# Patient Record
Sex: MALE | Race: WHITE | NOT HISPANIC OR LATINO | Employment: FULL TIME | URBAN - METROPOLITAN AREA
[De-identification: names, ages, dates, MRNs, and addresses within clinical notes are randomized per-mention and may not be internally consistent; named-entity substitution may affect disease eponyms.]

---

## 2019-04-03 ENCOUNTER — OFFICE VISIT (OUTPATIENT)
Dept: URGENT CARE | Facility: CLINIC | Age: 59
End: 2019-04-03
Payer: COMMERCIAL

## 2019-04-03 VITALS
OXYGEN SATURATION: 97 % | RESPIRATION RATE: 15 BRPM | WEIGHT: 197 LBS | HEART RATE: 69 BPM | BODY MASS INDEX: 29.86 KG/M2 | TEMPERATURE: 97 F | DIASTOLIC BLOOD PRESSURE: 80 MMHG | HEIGHT: 68 IN | SYSTOLIC BLOOD PRESSURE: 127 MMHG

## 2019-04-03 DIAGNOSIS — H53.8 BLURRED VISION: Primary | ICD-10-CM

## 2019-04-03 DIAGNOSIS — R73.9 HIGH BLOOD SUGAR: ICD-10-CM

## 2019-04-03 LAB
GLUCOSE SERPL-MCNC: 291 MG/DL (ref 70–110)
POC ANION GAP: 19 MMOL/L (ref 10–20)
POC BUN: 20 MMOL/L (ref 8–26)
POC CHLORIDE: 96 MMOL/L (ref 98–109)
POC CREATININE: 1.2 MG/DL (ref 0.6–1.3)
POC HEMATOCRIT: 39 %PCV (ref 42–52)
POC HEMOGLOBIN: 13.3 G/DL (ref 13.5–18)
POC ICA: 1.2 MMOL/L (ref 1.12–1.32)
POC POTASSIUM: 4.1 MMOL/L (ref 3.5–4.9)
POC SODIUM: 136 MMOL/L (ref 138–146)
POC TCO2: 26 MMOL/L (ref 24–29)

## 2019-04-03 PROCEDURE — 99214 OFFICE O/P EST MOD 30 MIN: CPT | Mod: S$GLB,,, | Performed by: NURSE PRACTITIONER

## 2019-04-03 PROCEDURE — 3008F BODY MASS INDEX DOCD: CPT | Mod: CPTII,S$GLB,, | Performed by: NURSE PRACTITIONER

## 2019-04-03 PROCEDURE — 80047 BASIC METABLC PNL IONIZED CA: CPT | Mod: QW,S$GLB,, | Performed by: NURSE PRACTITIONER

## 2019-04-03 PROCEDURE — 99214 PR OFFICE/OUTPT VISIT, EST, LEVL IV, 30-39 MIN: ICD-10-PCS | Mod: S$GLB,,, | Performed by: NURSE PRACTITIONER

## 2019-04-03 PROCEDURE — 80047 POCT CHEMISTRY PANEL: ICD-10-PCS | Mod: QW,S$GLB,, | Performed by: NURSE PRACTITIONER

## 2019-04-03 PROCEDURE — 3008F PR BODY MASS INDEX (BMI) DOCUMENTED: ICD-10-PCS | Mod: CPTII,S$GLB,, | Performed by: NURSE PRACTITIONER

## 2019-04-03 RX ORDER — EZETIMIBE 10 MG/1
10 TABLET ORAL DAILY
Refills: 5 | COMMUNITY
Start: 2019-03-29

## 2019-04-03 RX ORDER — MELOXICAM 15 MG/1
15 TABLET ORAL DAILY
Refills: 5 | COMMUNITY
Start: 2019-03-28

## 2019-04-03 RX ORDER — METFORMIN HYDROCHLORIDE 500 MG/1
500 TABLET ORAL 2 TIMES DAILY
Refills: 1 | COMMUNITY
Start: 2019-03-20

## 2019-04-03 RX ORDER — VALSARTAN AND HYDROCHLOROTHIAZIDE 160; 12.5 MG/1; MG/1
1 TABLET, FILM COATED ORAL DAILY
Refills: 0 | COMMUNITY
Start: 2019-01-15

## 2019-04-03 RX ORDER — PRAVASTATIN SODIUM 40 MG/1
40 TABLET ORAL DAILY
Refills: 5 | COMMUNITY
Start: 2019-03-29

## 2019-04-03 RX ORDER — OMEPRAZOLE 20 MG/1
20 CAPSULE, DELAYED RELEASE ORAL DAILY
Refills: 4 | COMMUNITY
Start: 2019-03-12

## 2019-04-03 RX ORDER — AMOXICILLIN 875 MG/1
TABLET, FILM COATED ORAL
Refills: 0 | COMMUNITY
Start: 2019-03-29

## 2019-04-03 RX ORDER — LEVOTHYROXINE SODIUM 137 UG/1
137 TABLET ORAL DAILY
Refills: 4 | COMMUNITY
Start: 2019-02-21

## 2019-04-03 NOTE — PROGRESS NOTES
"Subjective:       Patient ID: Med Gleason is a 58 y.o. male.    Vitals:  height is 5' 8" (1.727 m) and weight is 89.4 kg (197 lb). His oral temperature is 97.3 °F (36.3 °C). His blood pressure is 127/80 and his pulse is 69. His respiration is 15 and oxygen saturation is 97%.     Chief Complaint: Blurred Vision    Patient was recently diagnosed with Type 2 Diabetes. Reports blurred vision since yesterday.  Patient was started on metformin approximately 2 weeks ago.  He is visiting from out of town and returns home Friday evening.  Patient has not got a working blood glucose meter at home.  Claims he has had issues with checking his blood sugar at home.  Patient contact his primary care doctor which advised he come to an urgent care to get his blood sugar checked.  Claims that the blurred vision is intermittent.  Denies any nausea, vomiting, or abdominal pain.    Eye Problem    Both eyes are affected.This is a new problem. The current episode started yesterday. The problem occurs constantly. The problem has been unchanged. There was no injury mechanism. The patient is experiencing no pain. There is no known exposure to pink eye. He does not wear contacts. Associated symptoms include blurred vision. Pertinent negatives include no double vision, fever, nausea or vomiting. He has tried nothing for the symptoms.       Constitution: Positive for fatigue. Negative for chills and fever.   HENT: Negative for congestion and sore throat.    Neck: Negative for painful lymph nodes.   Cardiovascular: Negative for chest pain and leg swelling.   Eyes: Positive for blurred vision. Negative for double vision.   Respiratory: Negative for cough and shortness of breath.    Gastrointestinal: Negative for nausea, vomiting and diarrhea.   Genitourinary: Negative for dysuria, frequency and urgency.   Musculoskeletal: Negative for joint pain, joint swelling, muscle cramps and muscle ache.   Skin: Negative for color change, pale " and rash.   Allergic/Immunologic: Negative for seasonal allergies.   Neurological: Negative for dizziness, history of vertigo, light-headedness, passing out and headaches.   Hematologic/Lymphatic: Negative for swollen lymph nodes, easy bruising/bleeding and history of blood clots. Does not bruise/bleed easily.   Psychiatric/Behavioral: Negative for nervous/anxious, sleep disturbance and depression. The patient is not nervous/anxious.        Objective:      Physical Exam   Constitutional: He is oriented to person, place, and time. He appears well-developed and well-nourished.   HENT:   Head: Normocephalic and atraumatic.   Right Ear: External ear normal.   Left Ear: External ear normal.   Nose: Nose normal.   Mouth/Throat: Oropharynx is clear and moist.   Eyes: Pupils are equal, round, and reactive to light. Conjunctivae, EOM and lids are normal.   Neck: Trachea normal, full passive range of motion without pain and phonation normal. Neck supple.   Musculoskeletal: Normal range of motion.   Neurological: He is alert and oriented to person, place, and time.   Skin: Skin is warm, dry and intact.   Psychiatric: He has a normal mood and affect. His speech is normal and behavior is normal. Judgment and thought content normal. Cognition and memory are normal.   Nursing note and vitals reviewed.        Results for orders placed or performed in visit on 04/03/19   POCT Chemistry Panel   Result Value Ref Range    POC Sodium 136 (A) 138 - 146 MMOL/L    POC Potassium 4.1 3.5 - 4.9 MMOL/L    POC Chloride 96 (A) 98 - 109 MMOL/L    POC BUN 20 8 - 26 MMOL/L    POC Glucose 291 (A) 70 - 110 MG/DL    POC Creatinine 1.2 0.6 - 1.3 mg/dL    POC iCA 1.20 1.12 - 1.32 MMOL/L    POC TCO2 26 24 - 29 MMOL/L    POC Hematocrit 39 (A) 42 - 52 %PCV    POC Hemoglobin 13.3 (A) 13.5 - 18 g/dL    POC Anion Gap 19 10.0 - 20 MMOL/L     Patient's blood sugar machine was working in the clinic today.  Checked placements blood sugar with the patient on blood  sugar machine and it was 262.  Assessment:       1. Blurred vision    2. High blood sugar        Plan:       Patient is advised to continue monitoring his blood sugar at home and to contact his primary care doctor.  He is advised that if his symptoms worsen his blood sugar becomes elevated to go to the ER.  He is advised that if his symptoms worsen his blood sugar increases over 300-350 he should go to the ER for further evaluation.  Patient claims that he will contact his primary care doctor regarding his blood sugar reading.  Verbalizes understanding and is in agreement with treatment plan.  Blurred vision  -     Cancel: POCT Glucose, Hand-Held Device    High blood sugar  -     POCT Chemistry Panel      Patient Instructions     Blurred Vision  Blurred vision is the loss of sharpness of vision and the inability to see small details. Any changes in your vision, whether sudden or gradual, should be checked out by an eye specialist.  Vision changes can be caused by many different things. These include eye diseases, side effects of some medicines, or a condition like diabetes. Vision changes should never be ignored. It is common to assume that vision changes are due to needing more powerful vision correction. Making this assumption, many people postpone seeing their healthcare provider about their vision changes. Delaying care is risky, however. Some eye problems, if left untreated, can lead to permanent vision loss that is not correctable with glasses. This can significantly reduce quality of life.  Home care  · Make changes in your home to reduce the risk of falling.  ¨ Keep walkways clear of objects you may trip over. Use nonslip pads under rugs.  ¨ Do not walk in poorly lit areas.  ¨ Be cautious when stepping up and down from curbs and walking on uneven sidewalks.  · Brighter lighting in your home may help you see better.  Follow-up care  Follow up with an eye specialist or as advised. There are two types of eye  doctor you can consult:  · An optometrist is a licensed doctor of optometry. Optometrists are not medical doctors. Optometrists specialize in eye exams and may diagnose some eye problems. They also prescribe glasses and contact lenses.  · An ophthalmologist is a medical doctor who specializes in eye care. Ophthalmologists diagnose and treat all eye diseases, prescribe medicines, and perform eye surgery. They may also prescribe glasses and contact lenses.  An optometrist can provide a basic screening eye exam for much less cost than an ophthalmologist. The optometrist can tell you if your condition needs the services of an ophthalmologist.  When to seek medical advice  Call your healthcare provider right away if any of these occur.  · Sudden change in your vision  · Eye pain, redness, or discharge from your eyelid  · No improvement or worsening of blurriness  · Dark spots in your field of vision  · Halos around lights  · Floaters (dots or strings moving across your field of vision)  · Sudden flash of light inside your eye  · Dimness of vision  · Partial or complete loss of vision  Date Last Reviewed: 6/14/2015 © 2000-2017 [employer]. [address] [place] [address]. All rights reserved. This information is not intended as a substitute for professional medical care. Always follow your healthcare professional's instructions.        Diabetes with High Blood Sugar  You have been treated for high blood sugar (hyperglycemia). This may be because of an infection or other illness, eating too many sweets or starches, or not taking enough insulin.  Home care  Monitor and write down your blood sugar level at least twice a day. Do this before breakfast and before dinner. If you take insulin, record your routine insulin dose as well. Also record any additional doses required based on your sliding scale. Do this for the next 3 to 5 days.  High blood sugar may cause symptoms that you can learn to  "recognize, such as:  · Frequent urination  · Thirst  · Dizziness  · Headache  · Shortness of breath  · Breath that smells fruity  · Nausea or vomiting  · Abdominal pain  · Drowsiness or loss of consciousness  If you have high-blood-sugar symptoms, use a blood or urine test to find out what your blood sugar level is. If it is above your usual range, use the "sliding scale" regular insulin dose prescribed by your healthcare provider. If you were not given a range for your insulin dose, contact your healthcare provider for more advice.  Follow-up care  Follow up with your healthcare provider, or as advised. You may need to meet with your healthcare provider in the next week. You will likely review your blood sugar records together. You may also talk to your provider about adjusting your dose of insulin or other medicine for blood sugar.  When to seek medical advice  Call your healthcare provider right away if these occur:  · High blood sugar symptoms (Symptoms are described above.)  · Blood sugar over 300 mg/dl If you cant reach your healthcare provider, go to a hospital emergency room or urgent care center  Date Last Reviewed: 6/1/2016  © 9645-8374 [employer]. [address] [place] [address]. All rights reserved. This information is not intended as a substitute for professional medical care. Always follow your healthcare professional's instructions.      -Continue to monitor your blood sugar at home.  -If your symptoms worsen and you develop worsening symptoms such as worsening blurred vision, increase thirst, and increase appetite go to the ER.  -Contact your primary care doctor regarding you blood sugar.  -Follow up with your primary care doctor.  Please follow up with your Primary care provider within 2-5 days if your signs and symptoms have not resolved or worsen.     If your condition worsens or fails to improve we recommend that you receive another evaluation at the emergency room " immediately or contact your primary medical clinic to discuss your concerns.   You must understand that you have received an Urgent Care treatment only and that you may be released before all of your medical problems are known or treated. You, the patient, will arrange for follow up care as instructed.

## 2019-04-03 NOTE — PATIENT INSTRUCTIONS
Blurred Vision  Blurred vision is the loss of sharpness of vision and the inability to see small details. Any changes in your vision, whether sudden or gradual, should be checked out by an eye specialist.  Vision changes can be caused by many different things. These include eye diseases, side effects of some medicines, or a condition like diabetes. Vision changes should never be ignored. It is common to assume that vision changes are due to needing more powerful vision correction. Making this assumption, many people postpone seeing their healthcare provider about their vision changes. Delaying care is risky, however. Some eye problems, if left untreated, can lead to permanent vision loss that is not correctable with glasses. This can significantly reduce quality of life.  Home care  · Make changes in your home to reduce the risk of falling.  ¨ Keep walkways clear of objects you may trip over. Use nonslip pads under rugs.  ¨ Do not walk in poorly lit areas.  ¨ Be cautious when stepping up and down from curbs and walking on uneven sidewalks.  · Brighter lighting in your home may help you see better.  Follow-up care  Follow up with an eye specialist or as advised. There are two types of eye doctor you can consult:  · An optometrist is a licensed doctor of optometry. Optometrists are not medical doctors. Optometrists specialize in eye exams and may diagnose some eye problems. They also prescribe glasses and contact lenses.  · An ophthalmologist is a medical doctor who specializes in eye care. Ophthalmologists diagnose and treat all eye diseases, prescribe medicines, and perform eye surgery. They may also prescribe glasses and contact lenses.  An optometrist can provide a basic screening eye exam for much less cost than an ophthalmologist. The optometrist can tell you if your condition needs the services of an ophthalmologist.  When to seek medical advice  Call your healthcare provider right away if any of these  "occur.  · Sudden change in your vision  · Eye pain, redness, or discharge from your eyelid  · No improvement or worsening of blurriness  · Dark spots in your field of vision  · Halos around lights  · Floaters (dots or strings moving across your field of vision)  · Sudden flash of light inside your eye  · Dimness of vision  · Partial or complete loss of vision  Date Last Reviewed: 6/14/2015  © 0293-8945 [employer]. [address] [place] [address], [place] [address]. All rights reserved. This information is not intended as a substitute for professional medical care. Always follow your healthcare professional's instructions.        Diabetes with High Blood Sugar  You have been treated for high blood sugar (hyperglycemia). This may be because of an infection or other illness, eating too many sweets or starches, or not taking enough insulin.  Home care  Monitor and write down your blood sugar level at least twice a day. Do this before breakfast and before dinner. If you take insulin, record your routine insulin dose as well. Also record any additional doses required based on your sliding scale. Do this for the next 3 to 5 days.  High blood sugar may cause symptoms that you can learn to recognize, such as:  · Frequent urination  · Thirst  · Dizziness  · Headache  · Shortness of breath  · Breath that smells fruity  · Nausea or vomiting  · Abdominal pain  · Drowsiness or loss of consciousness  If you have high-blood-sugar symptoms, use a blood or urine test to find out what your blood sugar level is. If it is above your usual range, use the "sliding scale" regular insulin dose prescribed by your healthcare provider. If you were not given a range for your insulin dose, contact your healthcare provider for more advice.  Follow-up care  Follow up with your healthcare provider, or as advised. You may need to meet with your healthcare provider in the next week. You will likely review your blood sugar records together. You " may also talk to your provider about adjusting your dose of insulin or other medicine for blood sugar.  When to seek medical advice  Call your healthcare provider right away if these occur:  · High blood sugar symptoms (Symptoms are described above.)  · Blood sugar over 300 mg/dl If you cant reach your healthcare provider, go to a hospital emergency room or urgent care center  Date Last Reviewed: 6/1/2016  © 4769-4976 [employer]. [address] [place] [address], [place] [address]. All rights reserved. This information is not intended as a substitute for professional medical care. Always follow your healthcare professional's instructions.      -Continue to monitor your blood sugar at home.  -If your symptoms worsen and you develop worsening symptoms such as worsening blurred vision, increase thirst, and increase appetite go to the ER.  -Contact your primary care doctor regarding you blood sugar.  -Follow up with your primary care doctor.  Please follow up with your Primary care provider within 2-5 days if your signs and symptoms have not resolved or worsen.     If your condition worsens or fails to improve we recommend that you receive another evaluation at the emergency room immediately or contact your primary medical clinic to discuss your concerns.   You must understand that you have received an Urgent Care treatment only and that you may be released before all of your medical problems are known or treated. You, the patient, will arrange for follow up care as instructed.